# Patient Record
Sex: FEMALE | Race: WHITE | NOT HISPANIC OR LATINO | URBAN - METROPOLITAN AREA
[De-identification: names, ages, dates, MRNs, and addresses within clinical notes are randomized per-mention and may not be internally consistent; named-entity substitution may affect disease eponyms.]

---

## 2021-11-11 ENCOUNTER — CLAIMS CREATED FROM THE CLAIM WINDOW (OUTPATIENT)
Dept: URBAN - METROPOLITAN AREA CLINIC 70 | Facility: CLINIC | Age: 61
End: 2021-11-11
Payer: COMMERCIAL

## 2021-11-11 DIAGNOSIS — Z86.010 HISTORY OF COLON POLYPS: ICD-10-CM

## 2021-11-11 DIAGNOSIS — K21.9 GASTROESOPHAGEAL REFLUX DISEASE, UNSPECIFIED WHETHER ESOPHAGITIS PRESENT: ICD-10-CM

## 2021-11-11 DIAGNOSIS — K59.03 DRUG-INDUCED CONSTIPATION: ICD-10-CM

## 2021-11-11 DIAGNOSIS — K85.90 ACUTE PANCREATITIS WITHOUT INFECTION OR NECROSIS, UNSPECIFIED PANCREATITIS TYPE: ICD-10-CM

## 2021-11-11 PROCEDURE — 99214 OFFICE O/P EST MOD 30 MIN: CPT | Performed by: NURSE PRACTITIONER

## 2021-11-11 RX ORDER — OMEPRAZOLE 40 MG/1
1 CAPSULE 30 MINUTES BEFORE MORNING MEAL CAPSULE, DELAYED RELEASE ORAL ONCE A DAY
OUTPATIENT

## 2021-11-11 RX ORDER — OLMESARTAN MEDOXOMIL 40 MG/1
TAKE 1 TABLET (40 MG) BY ORAL ROUTE ONCE DAILY TABLET ORAL 1
Qty: 0 | Refills: 0 | Status: ACTIVE | COMMUNITY
Start: 1900-01-01

## 2021-11-11 RX ORDER — OMEPRAZOLE 40 MG/1
1 CAPSULE 30 MINUTES BEFORE MORNING MEAL CAPSULE, DELAYED RELEASE ORAL ONCE A DAY
Status: ACTIVE | COMMUNITY

## 2021-11-11 RX ORDER — GABAPENTIN 300 MG/1
1 CAPSULE CAPSULE ORAL ONCE A DAY
Status: ACTIVE | COMMUNITY

## 2021-11-11 RX ORDER — LEVOTHYROXINE SODIUM 137 UG/1
1 TABLET IN THE MORNING ON AN EMPTY STOMACH TABLET ORAL ONCE A DAY
Refills: 0 | Status: ACTIVE | COMMUNITY
Start: 1900-01-01

## 2021-11-11 NOTE — PHYSICAL EXAM GASTROINTESTINAL
Abdomen , soft, LUQ TTP, nondistended , no guarding or rigidity , no masses palpable , normal bowel sounds , Liver and Spleen , no hepatomegaly present , liver nontender

## 2021-11-11 NOTE — HPI-TODAY'S VISIT:
Patient is a 60 y/o female who presents today as an ER follow up for pancreatitis. She repors an acute onset of LUQ abdmoinal pain 2 weeks ago with going to ER on 11/6/21 with labs showing WBC13.58, lipase 184, LFTs WNL, H/H WNL, abd CT with findings of pancreatitis w/o fluid collection, and abd U/S with no gallstones, biliary ductal dilation, or cholecystitis. No prior episode of pancreatitis or Fhx of pancreastic disease. No alcohol use. Currently she reports continued LUQ pain that is slightly improved from onset with new onset of constipation due to pain medication she has been taking with no BM x 1 week with associated bloating. Tolerating mostly liquids with small amounts of solids. No fever. Denies N/V, diarrhea, or signs of GI bleeding. She has a hx of GERD with symptoms controlled on daily PPI and colon polyps. Last EGD 11/29/18 with findings of small hiatal hernia, and gastritis (bx neg H pylori). Last colonoscopy 5/3/19 with finding of polyp and diverticulosis with recall in 3 years (2022).

## 2021-11-15 LAB
A/G RATIO: 1.8
ALBUMIN: 4.2
ALKALINE PHOSPHATASE: 98
ALT (SGPT): 7
AST (SGOT): 12
BILIRUBIN, TOTAL: <0.2
BUN/CREATININE RATIO: 11
BUN: 12
C-REACTIVE PROTEIN, QUANT: 28
CALCIUM: 9.4
CARBON DIOXIDE, TOTAL: 22
CHLORIDE: 100
CREATININE: 1.07
EGFR IF AFRICN AM: 66
EGFR IF NONAFRICN AM: 57
GLOBULIN, TOTAL: 2.4
GLUCOSE: 143
HEMATOCRIT: 40.3
HEMOGLOBIN: 13.5
IGG, SUBCLASS 4: 65
LIPASE: 258
MCH: 30.9
MCHC: 33.5
MCV: 92
NRBC: (no result)
PLATELETS: 400
POTASSIUM: 3.9
PROTEIN, TOTAL: 6.6
RBC: 4.37
RDW: 12.9
SODIUM: 139
TRIGLYCERIDES: 260
WBC: 12.6

## 2021-12-10 ENCOUNTER — CLAIMS CREATED FROM THE CLAIM WINDOW (OUTPATIENT)
Dept: URBAN - METROPOLITAN AREA CLINIC 70 | Facility: CLINIC | Age: 61
End: 2021-12-10
Payer: COMMERCIAL

## 2021-12-10 ENCOUNTER — LAB OUTSIDE AN ENCOUNTER (OUTPATIENT)
Dept: URBAN - METROPOLITAN AREA CLINIC 70 | Facility: CLINIC | Age: 61
End: 2021-12-10

## 2021-12-10 ENCOUNTER — WEB ENCOUNTER (OUTPATIENT)
Dept: URBAN - METROPOLITAN AREA CLINIC 70 | Facility: CLINIC | Age: 61
End: 2021-12-10

## 2021-12-10 VITALS
SYSTOLIC BLOOD PRESSURE: 122 MMHG | HEART RATE: 77 BPM | BODY MASS INDEX: 25.1 KG/M2 | WEIGHT: 147 LBS | TEMPERATURE: 97.2 F | HEIGHT: 64 IN | DIASTOLIC BLOOD PRESSURE: 81 MMHG

## 2021-12-10 DIAGNOSIS — K85.90 ACUTE PANCREATITIS WITHOUT INFECTION OR NECROSIS, UNSPECIFIED PANCREATITIS TYPE: ICD-10-CM

## 2021-12-10 DIAGNOSIS — K59.03 DRUG-INDUCED CONSTIPATION: ICD-10-CM

## 2021-12-10 DIAGNOSIS — K21.9 GASTROESOPHAGEAL REFLUX DISEASE, UNSPECIFIED WHETHER ESOPHAGITIS PRESENT: ICD-10-CM

## 2021-12-10 DIAGNOSIS — Z86.010 HISTORY OF COLON POLYPS: ICD-10-CM

## 2021-12-10 PROBLEM — 235595009: Status: ACTIVE | Noted: 2021-11-11

## 2021-12-10 PROCEDURE — 99214 OFFICE O/P EST MOD 30 MIN: CPT | Performed by: NURSE PRACTITIONER

## 2021-12-10 RX ORDER — GABAPENTIN 300 MG/1
1 CAPSULE CAPSULE ORAL ONCE A DAY
Status: ACTIVE | COMMUNITY

## 2021-12-10 RX ORDER — OMEPRAZOLE 40 MG/1
1 CAPSULE 30 MINUTES BEFORE MORNING MEAL CAPSULE, DELAYED RELEASE ORAL ONCE A DAY
OUTPATIENT

## 2021-12-10 RX ORDER — SUCRALFATE 1 G/1
TABLET ORAL
Qty: 40 | Status: ACTIVE | COMMUNITY

## 2021-12-10 RX ORDER — LEVOTHYROXINE SODIUM 137 UG/1
1 TABLET IN THE MORNING ON AN EMPTY STOMACH TABLET ORAL ONCE A DAY
Refills: 0 | Status: ACTIVE | COMMUNITY
Start: 1900-01-01

## 2021-12-10 RX ORDER — OMEPRAZOLE 40 MG/1
1 CAPSULE 30 MINUTES BEFORE MORNING MEAL CAPSULE, DELAYED RELEASE ORAL ONCE A DAY
Status: ACTIVE | COMMUNITY

## 2021-12-10 RX ORDER — FLUCONAZOLE 150 MG/1
TABLET ORAL
Qty: 3 | Status: ACTIVE | COMMUNITY

## 2021-12-10 RX ORDER — OLMESARTAN MEDOXOMIL 40 MG/1
TAKE 1 TABLET (40 MG) BY ORAL ROUTE ONCE DAILY TABLET ORAL 1
Qty: 0 | Refills: 0 | Status: ACTIVE | COMMUNITY
Start: 1900-01-01

## 2021-12-10 NOTE — PHYSICAL EXAM GASTROINTESTINAL
Abdomen , soft,nontender, nondistended , no guarding or rigidity , no masses palpable , normal bowel sounds , Liver and Spleen , no hepatomegaly present , liver nontender

## 2021-12-10 NOTE — HPI-TODAY'S VISIT:
OV 11/11/21: Patient is a 62 y/o female who presents today as an ER follow up for pancreatitis. She repors an acute onset of LUQ abdmoinal pain 2 weeks ago with going to ER on 11/6/21 with labs showing WBC13.58, lipase 184, LFTs WNL, H/H WNL, abd CT with findings of pancreatitis w/o fluid collection, and abd U/S with no gallstones, biliary ductal dilation, or cholecystitis. No prior episode of pancreatitis or Fhx of pancreastic disease. No alcohol use. Currently she reports continued LUQ pain that is slightly improved from onset with new onset of constipation due to pain medication she has been taking with no BM x 1 week with associated bloating. Tolerating mostly liquids with small amounts of solids. No fever. Denies N/V, diarrhea, or signs of GI bleeding. She has a hx of GERD with symptoms controlled on daily PPI and colon polyps. Last EGD 11/29/18 with findings of small hiatal hernia, and gastritis (bx neg H pylori). Last colonoscopy 5/3/19 with finding of polyp and diverticulosis with recall in 3 years (2022). --------------------------------------------------------------------- Today 12/10/21: Patient presents today for follow up. Labs on 11/11/21 showed WBC improving, LFTs WNL, and IgG4/triglyceride level negative. She reports since last OV going to [facility] for evaluation of abdominal pain with N/V on 11/14/21 and underwent repeat abd CT that showed liver, gallbladder, and pancreas normal. She also had a HIDA scan with findings c/w biliary dyskinesia (EF 12%) and underwent cholecystectomy on 11/15/21. Today she reports feeling better with LUQ pain improved with only occasional mild pain. N/V improved with no recent vomiting but still has some rare nausea but is tolerating diet. Admits to generalized abdominal gas/bloating after surgery. Constipation improved on Miralax with last BM yesterday with soft formed stool. Denies fever, SOB, vomiting, constipation, diarrrhea, jaundice, or signs of GI bleeding.

## 2022-02-10 ENCOUNTER — OFFICE VISIT (OUTPATIENT)
Dept: URBAN - METROPOLITAN AREA CLINIC 70 | Facility: CLINIC | Age: 62
End: 2022-02-10

## 2023-03-22 ENCOUNTER — LAB OUTSIDE AN ENCOUNTER (OUTPATIENT)
Dept: URBAN - METROPOLITAN AREA CLINIC 70 | Facility: CLINIC | Age: 63
End: 2023-03-22

## 2023-03-22 ENCOUNTER — OFFICE VISIT (OUTPATIENT)
Dept: URBAN - METROPOLITAN AREA CLINIC 70 | Facility: CLINIC | Age: 63
End: 2023-03-22
Payer: COMMERCIAL

## 2023-03-22 ENCOUNTER — DASHBOARD ENCOUNTERS (OUTPATIENT)
Age: 63
End: 2023-03-22

## 2023-03-22 VITALS
WEIGHT: 144.8 LBS | HEIGHT: 64 IN | SYSTOLIC BLOOD PRESSURE: 153 MMHG | TEMPERATURE: 98.1 F | BODY MASS INDEX: 24.72 KG/M2 | HEART RATE: 58 BPM | DIASTOLIC BLOOD PRESSURE: 92 MMHG

## 2023-03-22 DIAGNOSIS — R11.2 NAUSEA AND VOMITING, UNSPECIFIED VOMITING TYPE: ICD-10-CM

## 2023-03-22 DIAGNOSIS — K85.90 ACUTE PANCREATITIS WITHOUT INFECTION OR NECROSIS, UNSPECIFIED PANCREATITIS TYPE: ICD-10-CM

## 2023-03-22 DIAGNOSIS — K21.9 GASTROESOPHAGEAL REFLUX DISEASE, UNSPECIFIED WHETHER ESOPHAGITIS PRESENT: ICD-10-CM

## 2023-03-22 DIAGNOSIS — R10.32 LLQ ABDOMINAL PAIN: ICD-10-CM

## 2023-03-22 DIAGNOSIS — K59.03 DRUG-INDUCED CONSTIPATION: ICD-10-CM

## 2023-03-22 DIAGNOSIS — Z86.010 HISTORY OF COLON POLYPS: ICD-10-CM

## 2023-03-22 PROCEDURE — 99214 OFFICE O/P EST MOD 30 MIN: CPT | Performed by: NURSE PRACTITIONER

## 2023-03-22 RX ORDER — OMEPRAZOLE 40 MG/1
1 CAPSULE 30 MINUTES BEFORE MORNING MEAL CAPSULE, DELAYED RELEASE ORAL ONCE A DAY
OUTPATIENT

## 2023-03-22 RX ORDER — ATORVASTATIN CALCIUM 80 MG/1
TABLET, FILM COATED ORAL
Qty: 90 TABLET | Status: ACTIVE | COMMUNITY

## 2023-03-22 RX ORDER — ASPIRIN 81 MG/1
1 TABLET TABLET, CHEWABLE ORAL ONCE A DAY
Qty: 30 | Status: ACTIVE | COMMUNITY
Start: 2023-03-22

## 2023-03-22 RX ORDER — SUCRALFATE 1 G/1
TABLET ORAL
Qty: 40 | Status: ON HOLD | COMMUNITY

## 2023-03-22 RX ORDER — GABAPENTIN 300 MG/1
1 CAPSULE CAPSULE ORAL ONCE A DAY
Status: ACTIVE | COMMUNITY

## 2023-03-22 RX ORDER — OLMESARTAN MEDOXOMIL 40 MG/1
TAKE 1 TABLET (40 MG) BY ORAL ROUTE ONCE DAILY TABLET ORAL 1
Qty: 0 | Refills: 0 | Status: ACTIVE | COMMUNITY
Start: 1900-01-01

## 2023-03-22 RX ORDER — LEVOTHYROXINE SODIUM 137 UG/1
1 TABLET IN THE MORNING ON AN EMPTY STOMACH TABLET ORAL ONCE A DAY
Refills: 0 | Status: ACTIVE | COMMUNITY
Start: 1900-01-01

## 2023-03-22 RX ORDER — OMEPRAZOLE 40 MG/1
1 CAPSULE 30 MINUTES BEFORE MORNING MEAL CAPSULE, DELAYED RELEASE ORAL ONCE A DAY
Status: ACTIVE | COMMUNITY

## 2023-03-22 RX ORDER — FLUCONAZOLE 150 MG/1
TABLET ORAL
Qty: 3 | Status: ON HOLD | COMMUNITY

## 2023-03-22 NOTE — HPI-TODAY'S VISIT:
OV 11/11/21: Patient is a 62 y/o female who presents today as an ER follow up for pancreatitis. She repors an acute onset of LUQ abdmoinal pain 2 weeks ago with going to ER on 11/6/21 with labs showing WBC13.58, lipase 184, LFTs WNL, H/H WNL, abd CT with findings of pancreatitis w/o fluid collection, and abd U/S with no gallstones, biliary ductal dilation, or cholecystitis. No prior episode of pancreatitis or Fhx of pancreastic disease. No alcohol use. Currently she reports continued LUQ pain that is slightly improved from onset with new onset of constipation due to pain medication she has been taking with no BM x 1 week with associated bloating. Tolerating mostly liquids with small amounts of solids. No fever. Denies N/V, diarrhea, or signs of GI bleeding. She has a hx of GERD with symptoms controlled on daily PPI and colon polyps. Last EGD 11/29/18 with findings of small hiatal hernia, and gastritis (bx neg H pylori). Last colonoscopy 5/3/19 with finding of polyp and diverticulosis with recall in 3 years (2022). --------------------------------------------------------------------- OV 12/10/21: Patient presents today for follow up. Labs on 11/11/21 showed WBC improving, LFTs WNL, and IgG4/triglyceride level negative. She reports since last OV going to [facility] for evaluation of abdominal pain with N/V on 11/14/21 and underwent repeat abd CT that showed liver, gallbladder, and pancreas normal. She also had a HIDA scan with findings c/w biliary dyskinesia (EF 12%) and underwent cholecystectomy on 11/15/21. Today she reports feeling better with LUQ pain improved with only occasional mild pain. N/V improved with no recent vomiting but still has some rare nausea but is tolerating diet. Admits to generalized abdominal gas/bloating after surgery. Constipation improved on Miralax with last BM yesterday with soft formed stool. Denies fever, SOB, vomiting, constipation, diarrrhea, jaundice, or signs of GI bleeding. ------------------------------------------------------------------------ Today 3/22/23: Patient presents today for surveillance colonsocopy due to hx of colon polyps. Last colonoscopy was in 2019 with findings of polyp and diverticulosis with recall in 3 years (2022). No Fhx of colon cancer. She states she had an episode of LLQ discomfort last week. Pain is now resolved. Has occasional constipation that is resolved with taking Miralax PRN. She also reports intermittent episodes of N/V only while having a BM then resolves. Prior upper abdominal pain remains resolved. Denies fever, CP, SOB, wt loss, dysphagia, odynophagia, diarrhea, or signs of GI bleeding.

## 2023-04-14 ENCOUNTER — CLAIMS CREATED FROM THE CLAIM WINDOW (OUTPATIENT)
Dept: URBAN - METROPOLITAN AREA SURGERY CENTER 24 | Facility: SURGERY CENTER | Age: 63
End: 2023-04-14

## 2023-04-14 ENCOUNTER — CLAIMS CREATED FROM THE CLAIM WINDOW (OUTPATIENT)
Dept: URBAN - METROPOLITAN AREA SURGERY CENTER 24 | Facility: SURGERY CENTER | Age: 63
End: 2023-04-14
Payer: COMMERCIAL

## 2023-04-14 DIAGNOSIS — D12.2 ADENOMA OF ASCENDING COLON: ICD-10-CM

## 2023-04-14 DIAGNOSIS — D12.4 ADENOMA OF DESCENDING COLON: ICD-10-CM

## 2023-04-14 DIAGNOSIS — Z86.010 HISTORY OF COLON POLYPS: ICD-10-CM

## 2023-04-14 DIAGNOSIS — D12.3 ADENOMA OF TRANSVERSE COLON: ICD-10-CM

## 2023-04-14 DIAGNOSIS — K63.89 APPENDICITIS EPIPLOICA: ICD-10-CM

## 2023-04-14 DIAGNOSIS — K62.1 ANAL AND RECTAL POLYP: ICD-10-CM

## 2023-04-14 PROCEDURE — G8907 PT DOC NO EVENTS ON DISCHARG: HCPCS | Performed by: INTERNAL MEDICINE

## 2023-04-14 PROCEDURE — 45380 COLONOSCOPY AND BIOPSY: CPT | Performed by: INTERNAL MEDICINE

## 2023-04-14 PROCEDURE — 45385 COLONOSCOPY W/LESION REMOVAL: CPT | Performed by: INTERNAL MEDICINE

## 2023-04-21 ENCOUNTER — OFFICE VISIT (OUTPATIENT)
Dept: URBAN - METROPOLITAN AREA SURGERY CENTER 24 | Facility: SURGERY CENTER | Age: 63
End: 2023-04-21

## 2023-04-21 ENCOUNTER — TELEPHONE ENCOUNTER (OUTPATIENT)
Dept: URBAN - METROPOLITAN AREA CLINIC 35 | Facility: CLINIC | Age: 63
End: 2023-04-21

## 2023-04-21 ENCOUNTER — CLAIMS CREATED FROM THE CLAIM WINDOW (OUTPATIENT)
Dept: URBAN - METROPOLITAN AREA SURGERY CENTER 24 | Facility: SURGERY CENTER | Age: 63
End: 2023-04-21
Payer: COMMERCIAL

## 2023-04-21 DIAGNOSIS — K29.60 ADENOPAPILLOMATOSIS GASTRICA: ICD-10-CM

## 2023-04-21 DIAGNOSIS — Z87.11 H/O GASTRIC ULCER: ICD-10-CM

## 2023-04-21 PROCEDURE — 43239 EGD BIOPSY SINGLE/MULTIPLE: CPT | Performed by: INTERNAL MEDICINE

## 2023-04-21 PROCEDURE — G8907 PT DOC NO EVENTS ON DISCHARG: HCPCS | Performed by: INTERNAL MEDICINE

## 2023-04-21 RX ORDER — FLUCONAZOLE 150 MG/1
TABLET ORAL
Qty: 3 | Status: ON HOLD | COMMUNITY

## 2023-04-21 RX ORDER — ASPIRIN 81 MG/1
1 TABLET TABLET, CHEWABLE ORAL ONCE A DAY
Qty: 30 | Status: ACTIVE | COMMUNITY
Start: 2023-03-22

## 2023-04-21 RX ORDER — SUCRALFATE 1 G/1
TABLET ORAL
Qty: 40 | Status: ON HOLD | COMMUNITY

## 2023-04-21 RX ORDER — OMEPRAZOLE 40 MG/1
1 CAPSULE 30 MINUTES BEFORE MORNING MEAL CAPSULE, DELAYED RELEASE ORAL ONCE A DAY
Status: ACTIVE | COMMUNITY

## 2023-04-21 RX ORDER — LEVOTHYROXINE SODIUM 137 UG/1
1 TABLET IN THE MORNING ON AN EMPTY STOMACH TABLET ORAL ONCE A DAY
Refills: 0 | Status: ACTIVE | COMMUNITY
Start: 1900-01-01

## 2023-04-21 RX ORDER — GABAPENTIN 300 MG/1
1 CAPSULE CAPSULE ORAL ONCE A DAY
Status: ACTIVE | COMMUNITY

## 2023-04-21 RX ORDER — ATORVASTATIN CALCIUM 80 MG/1
TABLET, FILM COATED ORAL
Qty: 90 TABLET | Status: ACTIVE | COMMUNITY

## 2023-04-21 RX ORDER — OLMESARTAN MEDOXOMIL 40 MG/1
TAKE 1 TABLET (40 MG) BY ORAL ROUTE ONCE DAILY TABLET ORAL 1
Qty: 0 | Refills: 0 | Status: ACTIVE | COMMUNITY
Start: 1900-01-01

## 2023-04-21 RX ORDER — OMEPRAZOLE 40 MG/1
1 CAPSULE 30 MINUTES BEFORE MORNING MEAL CAPSULE, DELAYED RELEASE ORAL ONCE A DAY
Qty: 60 | Refills: 0

## 2023-04-28 ENCOUNTER — OFFICE VISIT (OUTPATIENT)
Dept: URBAN - METROPOLITAN AREA SURGERY CENTER 24 | Facility: SURGERY CENTER | Age: 63
End: 2023-04-28

## 2023-05-26 ENCOUNTER — OFFICE VISIT (OUTPATIENT)
Dept: URBAN - METROPOLITAN AREA CLINIC 70 | Facility: CLINIC | Age: 63
End: 2023-05-26

## 2023-06-15 ENCOUNTER — OFFICE VISIT (OUTPATIENT)
Dept: URBAN - METROPOLITAN AREA CLINIC 70 | Facility: CLINIC | Age: 63
End: 2023-06-15